# Patient Record
Sex: FEMALE | Race: WHITE | NOT HISPANIC OR LATINO | Employment: FULL TIME | URBAN - METROPOLITAN AREA
[De-identification: names, ages, dates, MRNs, and addresses within clinical notes are randomized per-mention and may not be internally consistent; named-entity substitution may affect disease eponyms.]

---

## 2023-07-11 ENCOUNTER — OFFICE VISIT (OUTPATIENT)
Age: 33
End: 2023-07-11

## 2023-07-11 VITALS
HEART RATE: 92 BPM | HEIGHT: 64 IN | WEIGHT: 192 LBS | SYSTOLIC BLOOD PRESSURE: 122 MMHG | OXYGEN SATURATION: 99 % | DIASTOLIC BLOOD PRESSURE: 80 MMHG | BODY MASS INDEX: 32.78 KG/M2

## 2023-07-11 DIAGNOSIS — Z98.890 STATUS POST HEMORRHOIDECTOMY: Primary | ICD-10-CM

## 2023-07-11 DIAGNOSIS — Z87.19 STATUS POST HEMORRHOIDECTOMY: Primary | ICD-10-CM

## 2023-07-11 RX ORDER — LEVONORGESTREL 52 MG/1
1 INTRAUTERINE DEVICE INTRAUTERINE
COMMUNITY

## 2023-07-11 RX ORDER — CLINDAMYCIN PHOSPHATE 10 MG/G
1 GEL TOPICAL DAILY
COMMUNITY
Start: 2022-11-04 | End: 2023-11-04

## 2023-07-11 RX ORDER — NYSTATIN AND TRIAMCINOLONE ACETONIDE 100000; 1 [USP'U]/G; MG/G
1 OINTMENT TOPICAL 2 TIMES DAILY
COMMUNITY
Start: 2023-02-24 | End: 2024-02-24

## 2023-07-11 RX ORDER — PSEUDOEPHEDRINE HCL 30 MG
100 TABLET ORAL 2 TIMES DAILY
COMMUNITY
Start: 2023-05-10 | End: 2024-05-09

## 2023-07-11 NOTE — PROGRESS NOTES
Assessment/Plan:   . Anal margin wounds are healed patient is moving her bowels without incident describes no bleeding postoperatively    Plan:    Patient advised to maintain high-fiber diet continue Citrucel 2 tablets p.o. twice daily follow-up as needed    Status post complicated internal/external hemorrhoidectomy   No problem-specific Assessment & Plan notes found for this encounter. Diagnoses and all orders for this visit:    Status post hemorrhoidectomy    Other orders  -     clindamycin (CLINDAGEL) 1 % gel; Apply 1 Application topically daily  -     Docusate Sodium (DSS) 100 MG CAPS; Take 100 mg by mouth 2 (two) times a day  -     Levonorgestrel (Mirena, 52 MG,) 20 MCG/DAY IUD; 1 each by Intrauterine route  -     nystatin-triamcinolone (MYCOLOG-II) ointment; Apply 1 application. topically 2 (two) times a day          Subjective:     Patient ID: Brenden Richter is a 28 y.o. female. Rectal Surgery Post Op     Inspection of the anal margin reveals the wound to be completely healed post hemorrhoidectomy. Without complication.